# Patient Record
Sex: FEMALE | Race: WHITE | NOT HISPANIC OR LATINO | Employment: STUDENT | URBAN - METROPOLITAN AREA
[De-identification: names, ages, dates, MRNs, and addresses within clinical notes are randomized per-mention and may not be internally consistent; named-entity substitution may affect disease eponyms.]

---

## 2023-12-14 ENCOUNTER — OFFICE VISIT (OUTPATIENT)
Dept: OBSTETRICS AND GYNECOLOGY | Facility: CLINIC | Age: 18
End: 2023-12-14
Payer: COMMERCIAL

## 2023-12-14 ENCOUNTER — LAB (OUTPATIENT)
Dept: LAB | Facility: LAB | Age: 18
End: 2023-12-14
Payer: COMMERCIAL

## 2023-12-14 VITALS
BODY MASS INDEX: 18.9 KG/M2 | HEIGHT: 66 IN | HEART RATE: 56 BPM | SYSTOLIC BLOOD PRESSURE: 106 MMHG | WEIGHT: 117.6 LBS | DIASTOLIC BLOOD PRESSURE: 70 MMHG

## 2023-12-14 DIAGNOSIS — N91.2 AMENORRHEA: ICD-10-CM

## 2023-12-14 DIAGNOSIS — Z32.02 PREGNANCY TEST NEGATIVE: ICD-10-CM

## 2023-12-14 LAB
DHEA-S SERPL-MCNC: 150 UG/DL (ref 65–395)
FSH SERPL-ACNC: 2.4 IU/L
PREGNANCY TEST URINE, POC: NEGATIVE
PROLACTIN SERPL-MCNC: 2.3 UG/L (ref 3–20)

## 2023-12-14 PROCEDURE — 36415 COLL VENOUS BLD VENIPUNCTURE: CPT

## 2023-12-14 PROCEDURE — 83001 ASSAY OF GONADOTROPIN (FSH): CPT

## 2023-12-14 PROCEDURE — 84146 ASSAY OF PROLACTIN: CPT

## 2023-12-14 PROCEDURE — 84402 ASSAY OF FREE TESTOSTERONE: CPT

## 2023-12-14 PROCEDURE — 99203 OFFICE O/P NEW LOW 30 MIN: CPT | Performed by: NURSE PRACTITIONER

## 2023-12-14 PROCEDURE — 82627 DEHYDROEPIANDROSTERONE: CPT

## 2023-12-14 PROCEDURE — 99213 OFFICE O/P EST LOW 20 MIN: CPT | Performed by: NURSE PRACTITIONER

## 2023-12-14 RX ORDER — FLUTICASONE PROPIONATE 50 MCG
SPRAY, SUSPENSION (ML) NASAL
COMMUNITY
Start: 2022-10-14

## 2023-12-14 RX ORDER — NORETHINDRONE ACETATE/ETHINYL ESTRADIOL AND FERROUS FUMARATE 1MG-20(24)
1 KIT ORAL DAILY
COMMUNITY
Start: 2022-11-22

## 2023-12-14 ASSESSMENT — ENCOUNTER SYMPTOMS
RESPIRATORY NEGATIVE: 0
CONSTITUTIONAL NEGATIVE: 0
HEMATOLOGIC/LYMPHATIC NEGATIVE: 0
CARDIOVASCULAR NEGATIVE: 0
ENDOCRINE NEGATIVE: 0
GASTROINTESTINAL NEGATIVE: 0
ALLERGIC/IMMUNOLOGIC NEGATIVE: 0
NEUROLOGICAL NEGATIVE: 0
PSYCHIATRIC NEGATIVE: 0
EYES NEGATIVE: 0
MUSCULOSKELETAL NEGATIVE: 0

## 2023-12-14 ASSESSMENT — PAIN SCALES - GENERAL: PAINLEVEL: 0-NO PAIN

## 2023-12-14 NOTE — PROGRESS NOTES
Subjective   Patient ID: Titus Milner is a 17 y.o. female who presents for Amenorrhea.    She has lost almost 40 pounds over the last year  She is working out and really watching what she is eating.  She has not had a menses since June  She is away at school    Prior to this her menses were regular and light  No cramping    She was prescribed Microgestin by her PCP but never took it          Physical Exam  Constitutional:       Appearance: Normal appearance. She is normal weight.   Neurological:      Mental Status: She is alert.   Psychiatric:         Mood and Affect: Mood normal.         Behavior: Behavior normal.         Thought Content: Thought content normal.         Judgment: Judgment normal.   Vitals and nursing note reviewed.            Assessment/Plan   Diagnoses and all orders for this visit:  Amenorrhea  -     POC pregnancy, urine  -     Testosterone,Free and Total; Future  -     Follicle Stimulating Hormone; Future  -     Prolactin; Future  -     DHEA-Sulfate; Future  Pregnancy test negative  -     POC pregnancy, urine    Will check lab work.   Depending on results will order provera for withdraw bleed       DARIANA Schneider-CNP 12/14/23 9:37 AM

## 2023-12-15 ENCOUNTER — LAB (OUTPATIENT)
Dept: LAB | Facility: LAB | Age: 18
End: 2023-12-15
Payer: COMMERCIAL

## 2023-12-15 ENCOUNTER — OFFICE VISIT (OUTPATIENT)
Dept: PEDIATRICS | Facility: CLINIC | Age: 18
End: 2023-12-15
Payer: COMMERCIAL

## 2023-12-15 VITALS
HEIGHT: 66 IN | SYSTOLIC BLOOD PRESSURE: 107 MMHG | WEIGHT: 114.9 LBS | DIASTOLIC BLOOD PRESSURE: 73 MMHG | HEART RATE: 73 BPM | BODY MASS INDEX: 18.47 KG/M2

## 2023-12-15 DIAGNOSIS — R63.4 UNINTENDED WEIGHT LOSS: Primary | ICD-10-CM

## 2023-12-15 DIAGNOSIS — Z00.01 ENCOUNTER FOR ROUTINE ADULT HEALTH EXAMINATION WITH ABNORMAL FINDINGS: ICD-10-CM

## 2023-12-15 DIAGNOSIS — F50.82 AVOIDANT-RESTRICTIVE FOOD INTAKE DISORDER (ARFID): ICD-10-CM

## 2023-12-15 DIAGNOSIS — N91.1 SECONDARY AMENORRHEA: ICD-10-CM

## 2023-12-15 DIAGNOSIS — R63.4 UNINTENDED WEIGHT LOSS: ICD-10-CM

## 2023-12-15 PROBLEM — M21.70 ACQUIRED LEG LENGTH DISCREPANCY: Status: RESOLVED | Noted: 2023-12-15 | Resolved: 2023-12-15

## 2023-12-15 PROBLEM — M54.50 BACK PAIN, LUMBOSACRAL: Status: RESOLVED | Noted: 2023-12-15 | Resolved: 2023-12-15

## 2023-12-15 PROBLEM — B34.9 VIRAL SYNDROME: Status: RESOLVED | Noted: 2023-12-15 | Resolved: 2023-12-15

## 2023-12-15 PROBLEM — Z20.822 EXPOSURE TO COVID-19 VIRUS: Status: RESOLVED | Noted: 2023-12-15 | Resolved: 2023-12-15

## 2023-12-15 PROBLEM — L20.9 ATOPIC DERMATITIS, UNSPECIFIED: Status: RESOLVED | Noted: 2019-12-06 | Resolved: 2023-12-15

## 2023-12-15 PROBLEM — U07.1 COVID-19: Status: RESOLVED | Noted: 2023-12-15 | Resolved: 2023-12-15

## 2023-12-15 PROBLEM — H52.209 ASTIGMATISM: Status: ACTIVE | Noted: 2023-12-15

## 2023-12-15 PROBLEM — H69.90 ETD (EUSTACHIAN TUBE DYSFUNCTION): Status: RESOLVED | Noted: 2023-12-15 | Resolved: 2023-12-15

## 2023-12-15 PROBLEM — J31.0 RHINITIS: Status: RESOLVED | Noted: 2023-12-15 | Resolved: 2023-12-15

## 2023-12-15 PROBLEM — H65.91 RIGHT SEROUS OTITIS MEDIA: Status: RESOLVED | Noted: 2023-12-15 | Resolved: 2023-12-15

## 2023-12-15 PROBLEM — H52.10 AXIAL MYOPIA: Status: RESOLVED | Noted: 2023-12-15 | Resolved: 2023-12-15

## 2023-12-15 PROBLEM — H66.90 OTITIS MEDIA: Status: RESOLVED | Noted: 2023-12-15 | Resolved: 2023-12-15

## 2023-12-15 PROBLEM — J32.9 SINUSITIS: Status: RESOLVED | Noted: 2023-12-15 | Resolved: 2023-12-15

## 2023-12-15 PROBLEM — F51.8 DISRUPTED SLEEP-WAKE CYCLE: Status: RESOLVED | Noted: 2023-12-15 | Resolved: 2023-12-15

## 2023-12-15 PROBLEM — R05.9 COUGH: Status: RESOLVED | Noted: 2023-12-15 | Resolved: 2023-12-15

## 2023-12-15 PROBLEM — G43.709 CHRONIC MIGRAINE W/O AURA W/O STATUS MIGRAINOSUS, NOT INTRACTABLE: Status: ACTIVE | Noted: 2023-12-15

## 2023-12-15 PROBLEM — G47.20 DISRUPTED SLEEP-WAKE CYCLE: Status: RESOLVED | Noted: 2023-12-15 | Resolved: 2023-12-15

## 2023-12-15 PROBLEM — R51.9 HEADACHE: Status: RESOLVED | Noted: 2023-12-15 | Resolved: 2023-12-15

## 2023-12-15 PROBLEM — G43.909 MIGRAINE HEADACHE: Status: ACTIVE | Noted: 2023-12-15

## 2023-12-15 PROBLEM — M41.129 ADOLESCENT IDIOPATHIC SCOLIOSIS: Status: ACTIVE | Noted: 2023-12-15

## 2023-12-15 PROBLEM — J05.0 CROUP: Status: RESOLVED | Noted: 2023-12-15 | Resolved: 2023-12-15

## 2023-12-15 PROBLEM — J02.9 PHARYNGITIS, ACUTE: Status: RESOLVED | Noted: 2023-12-15 | Resolved: 2023-12-15

## 2023-12-15 LAB
BASOPHILS # BLD AUTO: 0.08 X10*3/UL (ref 0–0.1)
BASOPHILS NFR BLD AUTO: 1.4 %
EOSINOPHIL # BLD AUTO: 0.04 X10*3/UL (ref 0–0.7)
EOSINOPHIL NFR BLD AUTO: 0.7 %
ERYTHROCYTE [DISTWIDTH] IN BLOOD BY AUTOMATED COUNT: 11.3 % (ref 11.5–14.5)
HCT VFR BLD AUTO: 39.7 % (ref 36–46)
HGB BLD-MCNC: 13.3 G/DL (ref 12–16)
IMM GRANULOCYTES # BLD AUTO: 0.01 X10*3/UL (ref 0–0.7)
IMM GRANULOCYTES NFR BLD AUTO: 0.2 % (ref 0–0.9)
LYMPHOCYTES # BLD AUTO: 1.97 X10*3/UL (ref 1.2–4.8)
LYMPHOCYTES NFR BLD AUTO: 33.7 %
MCH RBC QN AUTO: 32.3 PG (ref 26–34)
MCHC RBC AUTO-ENTMCNC: 33.5 G/DL (ref 32–36)
MCV RBC AUTO: 96 FL (ref 80–100)
MONOCYTES # BLD AUTO: 0.42 X10*3/UL (ref 0.1–1)
MONOCYTES NFR BLD AUTO: 7.2 %
NEUTROPHILS # BLD AUTO: 3.33 X10*3/UL (ref 1.2–7.7)
NEUTROPHILS NFR BLD AUTO: 56.8 %
NRBC BLD-RTO: 0 /100 WBCS (ref 0–0)
PLATELET # BLD AUTO: 269 X10*3/UL (ref 150–450)
RBC # BLD AUTO: 4.12 X10*6/UL (ref 4–5.2)
TSH SERPL-ACNC: 1.01 MIU/L (ref 0.44–3.98)
WBC # BLD AUTO: 5.9 X10*3/UL (ref 4.4–11.3)

## 2023-12-15 PROCEDURE — 36415 COLL VENOUS BLD VENIPUNCTURE: CPT

## 2023-12-15 PROCEDURE — 82728 ASSAY OF FERRITIN: CPT

## 2023-12-15 PROCEDURE — 99395 PREV VISIT EST AGE 18-39: CPT | Performed by: PEDIATRICS

## 2023-12-15 PROCEDURE — 96127 BRIEF EMOTIONAL/BEHAV ASSMT: CPT | Performed by: PEDIATRICS

## 2023-12-15 PROCEDURE — 84443 ASSAY THYROID STIM HORMONE: CPT

## 2023-12-15 PROCEDURE — 83735 ASSAY OF MAGNESIUM: CPT

## 2023-12-15 PROCEDURE — 85025 COMPLETE CBC W/AUTO DIFF WBC: CPT

## 2023-12-15 PROCEDURE — 83550 IRON BINDING TEST: CPT

## 2023-12-15 PROCEDURE — 83540 ASSAY OF IRON: CPT

## 2023-12-15 PROCEDURE — 80053 COMPREHEN METABOLIC PANEL: CPT

## 2023-12-15 NOTE — PROGRESS NOTES
"Subjective     Titus is here with her mother for her annual WCC.    Parental Issues:  Questions or concerns:  either none, or only commonly asked age-specific questions; weight loss of 25 lbs since August and almost 40 lbs from last year. Titus has not had a period since June 2023. She saw GYN yesterday who ordered labs for secondary amenorrhea. She saw GI in August because of constipation.   Titus says she was not happy with her weight or eating habits last spring at college. She would eat meals during the day then \"binge\" at night. She was weight lifting and did not like how muscular she looked. She started to modify her eating habits last spring 2023. She limited her calories and carbs. She denies self harm or self induced emesis. She also denies laxative use but did use and weaned off of miralax per CARLYLE Farfan.   She is not seeing a therapist. She is surrounded by other young women with eating peculularities, ED's. She has good friends.   Mom is concerned about an ED with Titus. Titus says she wants to be healthy, says she realizes she went \"overboard\" with controlling her diet.   She is amenable to talking with a dietitian to develop some strategies for putting weight on in. She says she would go to health services at [facility] for weight and orthostatic bp/hr checks.  Family is leaving for vacation around [place].  Nutrition, Elimination, and Sleep:  Nutrition:  well-balanced diet, takes foods from each food group  Elimination:  normal frequency and quality of stool  Sleep:  normal for age    Social:  Peer relations:  no concerns  Family relations:  no concerns  School performance:  no concerns  Teen questionnaire:  reviewed  Activities:  sorority      Objective   Growth chart reviewed.  General:  Well-appearing, thin, dry skin,   hydrated  No acute distress, a little tearful when talking about eating and weight loss   Head:  Normocephalic   Eyes:  Lids and conjunctivae normal  Sclerae white  Pupils equal " and reactive   ENT:  Ears:  TMs normal bilaterally  Mouth:  mucosa moist; no visible lesions  Throat:  OP moist and clear; uvula midline  Neck:  supple; no thyroid enlargement   Respiratory:  Respiratory rate:  normal  Air exchange:  normal   Adventitious breath sounds:  none  Accessory muscle use:  none   Heart:  Rate and rhythm:  regular  Murmur:  none    Abdomen:  Palpation:  soft, non-tender, non-distended, no masses; flat/almost scaphoid abd, no masses  Organs:  no HSM  Bowel sounds:  normal   :  Normal external genitalia  Stef stage:  5   MSK: Range of motion:  grossly normal in all joints  Swelling:  none  Muscle bulk and strength:  grossly normal   Skin:  Warm and well-perfused  No rashes   Lymphatic: No nodes larger than 1 cm palpated  No firm or fixed nodes palpated   Neuro:  Alert  Moves all extremities spontaneously  CN:  grossly intact  Tone:  normal      Assessment/Plan   Titus has had an abnormal weight loss, secondary amenorrhea as a result of it. She is orthostatic by pulse but not by BP. I have ordered labs, cbc, cmp, magnesium, prealbumin, tsh; and EKG. I would like her to see Deanne Merino RD. I also want tohave her return next week for a weight check and review labs, EKG and plan for returning to college after winter break.    - Anticipatory guidance regarding development, safety, nutrition, physical activity, and sleep reviewed.  - Growth:  appropriate for age  - Development:  active and social   - Social:  teenage questionnaire completed and reviewed.  Issues of smoking, vaping, substance use, sexuality, and mood discussed.    - Vaccines:  as documented  - Return in 1 year for annual well child exam or sooner if concerns arise

## 2023-12-16 LAB
ALBUMIN SERPL BCP-MCNC: 5.1 G/DL (ref 3.4–5)
ALP SERPL-CCNC: 45 U/L (ref 33–110)
ALT SERPL W P-5'-P-CCNC: 26 U/L (ref 7–45)
ANION GAP SERPL CALC-SCNC: 17 MMOL/L (ref 10–20)
AST SERPL W P-5'-P-CCNC: 13 U/L (ref 9–39)
BILIRUB SERPL-MCNC: 0.6 MG/DL (ref 0–1.2)
BUN SERPL-MCNC: 18 MG/DL (ref 6–23)
CALCIUM SERPL-MCNC: 10.2 MG/DL (ref 8.6–10.6)
CHLORIDE SERPL-SCNC: 101 MMOL/L (ref 98–107)
CO2 SERPL-SCNC: 29 MMOL/L (ref 21–32)
CREAT SERPL-MCNC: 0.89 MG/DL (ref 0.5–1.05)
FERRITIN SERPL-MCNC: 78 NG/ML (ref 8–150)
GFR SERPL CREATININE-BSD FRML MDRD: >90 ML/MIN/1.73M*2
GLUCOSE SERPL-MCNC: 91 MG/DL (ref 74–99)
IRON SATN MFR SERPL: 32 % (ref 25–45)
IRON SERPL-MCNC: 100 UG/DL (ref 35–150)
MAGNESIUM SERPL-MCNC: 1.93 MG/DL (ref 1.6–2.4)
POTASSIUM SERPL-SCNC: 4.4 MMOL/L (ref 3.5–5.3)
PROT SERPL-MCNC: 7.7 G/DL (ref 6.4–8.2)
SODIUM SERPL-SCNC: 143 MMOL/L (ref 136–145)
TIBC SERPL-MCNC: 311 UG/DL (ref 240–445)
UIBC SERPL-MCNC: 211 UG/DL (ref 110–370)

## 2023-12-18 ENCOUNTER — HOSPITAL ENCOUNTER (OUTPATIENT)
Dept: CARDIOLOGY | Facility: CLINIC | Age: 18
Discharge: HOME | End: 2023-12-18
Payer: COMMERCIAL

## 2023-12-18 ENCOUNTER — APPOINTMENT (OUTPATIENT)
Dept: CARDIOLOGY | Facility: CLINIC | Age: 18
End: 2023-12-18
Payer: COMMERCIAL

## 2023-12-18 DIAGNOSIS — R63.4 UNINTENDED WEIGHT LOSS: ICD-10-CM

## 2023-12-18 LAB
TESTOSTERONE FREE (CHAN): 2 PG/ML (ref 0.1–6.4)
TESTOSTERONE,TOTAL,LC-MS/MS: 15 NG/DL (ref 2–45)

## 2023-12-18 PROCEDURE — 93010 ELECTROCARDIOGRAM REPORT: CPT | Performed by: STUDENT IN AN ORGANIZED HEALTH CARE EDUCATION/TRAINING PROGRAM

## 2023-12-18 PROCEDURE — 93005 ELECTROCARDIOGRAM TRACING: CPT

## 2023-12-19 LAB
ATRIAL RATE: 40 BPM
P AXIS: 66 DEGREES
P OFFSET: 208 MS
P ONSET: 150 MS
PR INTERVAL: 140 MS
Q ONSET: 220 MS
QRS COUNT: 6 BEATS
QRS DURATION: 92 MS
QT INTERVAL: 472 MS
QTC CALCULATION(BAZETT): 384 MS
QTC FREDERICIA: 412 MS
R AXIS: 104 DEGREES
T AXIS: 52 DEGREES
T OFFSET: 456 MS
VENTRICULAR RATE: 40 BPM

## 2023-12-19 NOTE — RESULT ENCOUNTER NOTE
Titus, I reviewed your labs and EKG. I think we have a follow up visit in a couple of days before your family goes away. How are you feeling and what updates do you have for me?   Please message me before we get together.

## 2023-12-20 ENCOUNTER — APPOINTMENT (OUTPATIENT)
Dept: OBSTETRICS AND GYNECOLOGY | Facility: CLINIC | Age: 18
End: 2023-12-20

## 2023-12-22 ENCOUNTER — OFFICE VISIT (OUTPATIENT)
Dept: PEDIATRICS | Facility: CLINIC | Age: 18
End: 2023-12-22
Payer: COMMERCIAL

## 2023-12-22 VITALS
DIASTOLIC BLOOD PRESSURE: 66 MMHG | SYSTOLIC BLOOD PRESSURE: 102 MMHG | BODY MASS INDEX: 18.58 KG/M2 | WEIGHT: 115.1 LBS | HEART RATE: 45 BPM

## 2023-12-22 DIAGNOSIS — R62.51 POOR WEIGHT GAIN IN PEDIATRIC PATIENT: ICD-10-CM

## 2023-12-22 DIAGNOSIS — R00.1 BRADYCARDIA: ICD-10-CM

## 2023-12-22 DIAGNOSIS — F50.82 AVOIDANT-RESTRICTIVE FOOD INTAKE DISORDER (ARFID): Primary | ICD-10-CM

## 2023-12-22 DIAGNOSIS — N91.1 SECONDARY AMENORRHEA: ICD-10-CM

## 2023-12-22 LAB
POC APPEARANCE, URINE: CLEAR
POC BILIRUBIN, URINE: NEGATIVE
POC BLOOD, URINE: NEGATIVE
POC COLOR, URINE: YELLOW
POC GLUCOSE, URINE: NEGATIVE MG/DL
POC KETONES, URINE: NEGATIVE MG/DL
POC LEUKOCYTES, URINE: NEGATIVE
POC NITRITE,URINE: NEGATIVE
POC PH, URINE: 7 PH
POC PROTEIN, URINE: NEGATIVE MG/DL
POC SPECIFIC GRAVITY, URINE: 1.01
POC UROBILINOGEN, URINE: 0.2 EU/DL

## 2023-12-22 PROCEDURE — 81003 URINALYSIS AUTO W/O SCOPE: CPT | Performed by: PEDIATRICS

## 2023-12-22 PROCEDURE — 99214 OFFICE O/P EST MOD 30 MIN: CPT | Performed by: PEDIATRICS

## 2023-12-22 RX ORDER — NORETHINDRONE ACETATE AND ETHINYL ESTRADIOL AND FERROUS FUMARATE 1MG-20(24)
1 KIT ORAL DAILY
Qty: 84 TABLET | Refills: 3 | Status: SHIPPED | OUTPATIENT
Start: 2023-12-22 | End: 2024-03-08 | Stop reason: SDUPTHER

## 2023-12-22 NOTE — PROGRESS NOTES
"Subjective     History was provided by the mother and patient .    Titus is here with the following concern:    Follow up labs and EKG and weight/bp/hr check for eating disorder. Bradycardia to 40 on EKG. I reviewed Titus's apple watch that has recorded her resting HR and it ranged from  over the past 6 months. Usually resting HR (while awake) in 50's, some dips to 40-45 but not sustained.   Weight is about the same as last week, up a few ounces. She has been eating more often, still some taboo foods and mostly \"clean\" vegetables, protein, not large portions. I spoke with Alisia Merino RD about her assessment and advice provided to Titus. She plans to continue to see Titus rmotely as long as Titus has weekly weight checks when back at [place] as well as monitoring orthostatic BP/HR. Titus says she agrees to doing the weekly checks at [place]. I recommend that she set up appointments now for mid January when she plans to return. I will write a letter for her to give providers there.    Sleeping ok. Titus would like to go the gym to do strength training and work out. She has not done anything since last week when I saw her.    Family is going to [place] in 2 days for a week.    Objective     /66 (BP Location: Left arm, Patient Position: Lying)   Pulse (!) 45   Wt 52.2 kg (115 lb 1.6 oz)   LMP 06/10/2023 (Approximate)   BMI 18.58 kg/m²   @physicalexam@    General:  Well-appearing, well hydrated , thin underweight and in no acute distress     Eyes:  Lids:  normal  Conjunctivae:  normal     ENT:  Ears:  RTM: normal yes           LTM:  normal yes  Nose:  nares clear  Mouth:  mucosa moist; no visible lesions  Throat:  OP clear yes and moist; uvula midline  Neck:  supple     Respiratory:  Respiratory rate:  normal  Air exchange:  normal   Adventitious breath sounds:  none  Accessory muscle use:  none     Heart:  Regular rate and rhythm, no murmur     GI: Normal bowel sounds, soft, non-tender, no HSM   "   Skin:  Warm and well-perfused and no rashes apparent     Lymphatic: No nodes larger than 1 cm palpated  No firm or fixed nodes palpated       Assessment/Plan     Titus Milner is well-appearing, well-hydrated, in no acute distress, and afebrile at today's visit.    Her clinical presentation and examination indicates the diagnosis of Anorexia nervosa; weight unchanged despite increase meals, not exercising. Also bradycardia.   I reviewed BP/HR, dipped to 45; BP stable with change in position. UA clear.      Her treatment plan includes (1) increase calories based on recommendations from dietitian  (2) no exercise other than walks with parents/sibs; no strength training  (3) water/electrolyte solution to replenish losses, boost intake  (4) Make appointments with [facility]; identify provider with whom she can follow  (5) Start OCP for amenorrhea, I will send in rx    (6) follow up 1/8/24 for wt/bp/hr/ua.    Supportive care measures and expected course of illness reviewed.    Follow up promptly for worsening or prolonged illness.    La Cadena MD

## 2024-01-07 PROBLEM — F50.82 AVOIDANT-RESTRICTIVE FOOD INTAKE DISORDER (ARFID): Status: ACTIVE | Noted: 2024-01-07

## 2024-01-08 ENCOUNTER — OFFICE VISIT (OUTPATIENT)
Dept: PEDIATRICS | Facility: CLINIC | Age: 19
End: 2024-01-08
Payer: COMMERCIAL

## 2024-01-08 DIAGNOSIS — F50.82 AVOIDANT-RESTRICTIVE FOOD INTAKE DISORDER (ARFID): Primary | ICD-10-CM

## 2024-01-08 PROCEDURE — 99214 OFFICE O/P EST MOD 30 MIN: CPT | Performed by: PEDIATRICS

## 2024-01-08 NOTE — PROGRESS NOTES
Subjective   Patient ID: Titus Milner is a 18 y.o. female who is here with her mother, who gives much of the history, for concern of Weight Check.    HPI  Titus is here for follow-up of weight loss related to to ARFID.  She denies any concerns or symptoms of malnourishment.  Last dizziness was ~Thanksgiving 2023  Denies purging  Cutting out exercise has been difficult  She has not found it too challenging to abide by the recommendations of the nutritionist, Zaira Merino.  Her energy level has improved with improved food intake.  She feels her mood is good and denies symptoms of depression and anxiety.  She hopes to return to college at [employer] in 2 days.  She has not yet been able to identify a provider in the [facility] health services on campus as they have not been open; they will be returning to their office as the students return, and she plans to do so.    Objective   Vitals:    01/08/24 1008 01/08/24 1029 01/08/24 1035   BP:  94/66 96/66   Patient Position:  Sitting Standing   Pulse:  64 64   Temp: 36.7 °C (98 °F)     TempSrc: Temporal     Weight: 53.7 kg (118 lb 4.8 oz)        Physical Exam  Vitals reviewed.   Constitutional:       General: She is not in acute distress.     Appearance: Normal appearance. She is not ill-appearing.   HENT:      Nose: Nose normal.      Mouth/Throat:      Mouth: Mucous membranes are moist.      Pharynx: Oropharynx is clear.   Eyes:      Conjunctiva/sclera: Conjunctivae normal.   Neck:      Thyroid: No thyroid mass or thyromegaly.   Cardiovascular:      Rate and Rhythm: Normal rate and regular rhythm.      Pulses: Normal pulses.      Heart sounds: Normal heart sounds. No murmur heard.     No gallop.   Pulmonary:      Effort: Pulmonary effort is normal. No respiratory distress.      Breath sounds: Normal breath sounds.   Abdominal:      General: There is no distension.      Palpations: Abdomen is soft. There is no hepatomegaly, splenomegaly or mass.      Tenderness:  There is no abdominal tenderness.      Hernia: No hernia is present.   Musculoskeletal:         General: No swelling, deformity or signs of injury.      Cervical back: Normal range of motion and neck supple.   Skin:     General: Skin is warm and dry.   Neurological:      Motor: No weakness.     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  She is cleared to return to college, per previously stated plan.  She will connect with a provider on campus, continue to see a nutritionist, and follow-up with her PCP Dr. Cadena in 2 weeks, sooner if needed.

## 2024-01-09 VITALS
SYSTOLIC BLOOD PRESSURE: 96 MMHG | HEART RATE: 64 BPM | TEMPERATURE: 98 F | BODY MASS INDEX: 19.09 KG/M2 | WEIGHT: 118.3 LBS | DIASTOLIC BLOOD PRESSURE: 66 MMHG

## 2024-01-22 ENCOUNTER — TELEMEDICINE (OUTPATIENT)
Dept: PEDIATRICS | Facility: CLINIC | Age: 19
End: 2024-01-22
Payer: COMMERCIAL

## 2024-01-22 VITALS — WEIGHT: 117 LBS | BODY MASS INDEX: 18.88 KG/M2

## 2024-01-22 DIAGNOSIS — F50.82 AVOIDANT-RESTRICTIVE FOOD INTAKE DISORDER (ARFID): Primary | ICD-10-CM

## 2024-01-22 PROCEDURE — 99213 OFFICE O/P EST LOW 20 MIN: CPT | Performed by: PEDIATRICS

## 2024-01-22 NOTE — PROGRESS NOTES
Follow up ED, weight loss, amenorrhea via virtual visit.    Titus is at [employer] and she is adjusting to college life again. She has told close friends about what she is working on, support her to make better choices. She weighed herself at the gym yesterday, 117#, no loss but no weight gain.  She has limited exercise but would like to get back to it. She is seeing Alisia Merino dietitian every other week. Alisia given Titus specific ideas about getting enough calories in.     Alert, well appearing, NAD.  Oriented person/place, etc.  Nonfocal exam.    Titus will contact student health at [employer] today to set up weekly weigh ins and orthostatic vital signs. If titus cannot get in this week/next week, she will set up another virtual appointment with me.

## 2024-01-26 ENCOUNTER — TELEPHONE (OUTPATIENT)
Dept: PEDIATRICS | Facility: CLINIC | Age: 19
End: 2024-01-26
Payer: COMMERCIAL

## 2024-02-29 NOTE — PROGRESS NOTES
Notes from [facility]:    Weight 2/13/24 121 lb; VS: 108/69, p 60 lying; 108/72, p 66 sitting; 108/74, p 73 standing    2/20/2024:  124.6 lb; VS: 116/77 p 68 lying; 117/81, p 76 sitting; 119/85, p 80 standing    2/27/2024; 123.2 LB; 113/67 P62 LYING; 107/69 P 75 SITTING; 11/75 p 84 standing

## 2024-03-08 DIAGNOSIS — N91.1 SECONDARY AMENORRHEA: ICD-10-CM

## 2024-03-08 RX ORDER — NORETHINDRONE ACETATE AND ETHINYL ESTRADIOL AND FERROUS FUMARATE 1MG-20(24)
1 KIT ORAL DAILY
Qty: 84 TABLET | Refills: 3 | Status: SHIPPED | OUTPATIENT
Start: 2024-03-08 | End: 2024-05-04 | Stop reason: SDUPTHER

## 2024-03-25 ENCOUNTER — TELEMEDICINE (OUTPATIENT)
Dept: PEDIATRICS | Facility: CLINIC | Age: 19
End: 2024-03-25
Payer: COMMERCIAL

## 2024-03-25 DIAGNOSIS — F50.82 AVOIDANT-RESTRICTIVE FOOD INTAKE DISORDER (ARFID): Primary | ICD-10-CM

## 2024-03-25 DIAGNOSIS — N91.1 SECONDARY AMENORRHEA: ICD-10-CM

## 2024-03-25 PROCEDURE — 1036F TOBACCO NON-USER: CPT | Performed by: PEDIATRICS

## 2024-03-25 PROCEDURE — 99214 OFFICE O/P EST MOD 30 MIN: CPT | Performed by: PEDIATRICS

## 2024-03-25 NOTE — PROGRESS NOTES
Subjective     History was provided by the  patient . Visit was done via telehealth in real time.    Titus is here with the following concern:    Follow up eating disorder, weight loss, amenorrhea. Titus has been going for weekly weight checks at [facility],  meeting with a dietitian and therapist regularly. She has been steadily regaining weight and is currently 126 #, resting HR >60; vital signs have been normal.   Titus says she is feeling more energetic, started to go to gym 2 days a week to weight lift. Dietitian has given her recovery snack recommendations.  Titus will finish her exams May 2 and return to [employer] on may 4.  She is going to [facility] for 8 weeks this summer.    She is on ocp but has not had a period on placebo pills. She started her 4 th pill pack. She is not currently SA. She is concerned that even though she has gained weight, is feeling better that she is not having a withdrawal bleed. We talked about going off of ocp for a couple of months to see if she gets her period but with travel, it is a little difficult.    Objective     There were no vitals taken for this visit.  @physicalexam@    General:  Well-appearing, well hydrated and in no acute distress     Eyes:  Lids:  normal  Conjunctivae:  normal     ENT:     Respiratory:     Heart:     GI:    Skin:       Assessment/Plan     Titus Milner is well-appearing, well-hydrated, in no acute distress, and afebrile at today's visit.    Her clinical presentation and examination indicates the diagnosis of resolving restrictive eating and weight loss; still amenorrhea but on ocp.    Her treatment plan includes ok to back off on weekly weigh ins, go to every other week. TO follow up with me in early May when back in [place]. Continue therapy and dietitian help every 2-3 weeks.    Supportive care measures and expected course of illness reviewed.    Follow up promptly for worsening or prolonged illness.    La Cadena,  MD

## 2024-05-04 DIAGNOSIS — N91.1 SECONDARY AMENORRHEA: ICD-10-CM

## 2024-05-06 ENCOUNTER — OFFICE VISIT (OUTPATIENT)
Dept: PEDIATRICS | Facility: CLINIC | Age: 19
End: 2024-05-06
Payer: COMMERCIAL

## 2024-05-06 VITALS
OXYGEN SATURATION: 99 % | BODY MASS INDEX: 21.31 KG/M2 | DIASTOLIC BLOOD PRESSURE: 69 MMHG | HEART RATE: 58 BPM | SYSTOLIC BLOOD PRESSURE: 108 MMHG | WEIGHT: 132 LBS

## 2024-05-06 DIAGNOSIS — N91.1 SECONDARY AMENORRHEA: ICD-10-CM

## 2024-05-06 DIAGNOSIS — F50.82 AVOIDANT-RESTRICTIVE FOOD INTAKE DISORDER (ARFID): Primary | ICD-10-CM

## 2024-05-06 PROCEDURE — 99213 OFFICE O/P EST LOW 20 MIN: CPT | Performed by: PEDIATRICS

## 2024-05-06 RX ORDER — NORETHINDRONE ACETATE AND ETHINYL ESTRADIOL AND FERROUS FUMARATE 1MG-20(24)
1 KIT ORAL DAILY
Qty: 84 TABLET | Refills: 3 | Status: SHIPPED | OUTPATIENT
Start: 2024-05-06 | End: 2025-05-06

## 2024-05-06 RX ORDER — NORETHINDRONE ACETATE AND ETHINYL ESTRADIOL AND FERROUS FUMARATE 1MG-20(24)
1 KIT ORAL DAILY
Qty: 84 TABLET | Refills: 3 | Status: SHIPPED | OUTPATIENT
Start: 2024-05-06 | End: 2024-05-06 | Stop reason: SDUPTHER

## 2024-05-06 NOTE — PROGRESS NOTES
Subjective     History was provided by the  patient .    Titus is here with the following concern:    Here for follow up AN; weight restored and finished follow up in Student [facility] at [employer]. Saw a nutritionist, therapist, MD.   On placebo pills and Titus would like to stop OCP.  Started to work out again, weight lifting 3 times/week.  Titus is going to [facility] for the summer.     She noticed when she regained weight she has more energy and is able to think more clearly. She does not think she will need to continue weight monitoring next fall at [facility].     Objective     /69 (Patient Position: Lying)   Pulse 58   Wt 59.9 kg (132 lb)   SpO2 99%   BMI 21.31 kg/m²   @physicalexam@    General:  Well-appearing, well hydrated and in no acute distress     Eyes:  Lids:  normal  Conjunctivae:  normal     ENT:  Ears:  RTM: normal yes           LTM:  normal yes  Nose:  nares clear  Mouth:  mucosa moist; no visible lesions  Throat:  OP clear yes and moist; uvula midline  Neck:  supple     Respiratory:  Respiratory rate:  normal  Air exchange:  normal   Adventitious breath sounds:  none  Accessory muscle use:  none     Heart:  Regular rate and rhythm, no murmur     GI: Normal bowel sounds, soft, non-tender, no HSM     Skin:  Warm and well-perfused and no rashes apparent     Lymphatic: No nodes larger than 1 cm palpated  No firm or fixed nodes palpated       Assessment/Plan     Titus Milner is well-appearing, well-hydrated, in no acute distress, and afebrile at today's visit.    Her clinical presentation and examination indicates the diagnosis of resolved weight loss, weight restored, anticipate menses soon (spotted last month). Stop ocp and see how periods are. I sent in rx for ocp packs to take with her to [facility] in case she wants to restart ocp while away.     Her treatment plan includes no restrictions, continue with healthier eating habits. Follow up re ocp as needed. Weight check before going back  to [facility] in August. Sooner if other concerns arise.    Supportive care measures and expected course of illness reviewed.    Follow up promptly for worsening or prolonged illness.    La Cadena MD

## 2024-11-29 ENCOUNTER — APPOINTMENT (OUTPATIENT)
Dept: OPHTHALMOLOGY | Facility: CLINIC | Age: 19
End: 2024-11-29
Payer: COMMERCIAL

## 2024-11-29 DIAGNOSIS — H52.13 MYOPIA OF BOTH EYES: Primary | ICD-10-CM

## 2024-11-29 ASSESSMENT — ENCOUNTER SYMPTOMS
NEUROLOGICAL NEGATIVE: 0
PSYCHIATRIC NEGATIVE: 0
RESPIRATORY NEGATIVE: 0
MUSCULOSKELETAL NEGATIVE: 0
CARDIOVASCULAR NEGATIVE: 0
CONSTITUTIONAL NEGATIVE: 0
HEMATOLOGIC/LYMPHATIC NEGATIVE: 0
ENDOCRINE NEGATIVE: 0
ALLERGIC/IMMUNOLOGIC NEGATIVE: 0
GASTROINTESTINAL NEGATIVE: 0
EYES NEGATIVE: 1

## 2024-11-29 ASSESSMENT — CONF VISUAL FIELD
OS_NORMAL: 1
OD_NORMAL: 1
OS_INFERIOR_NASAL_RESTRICTION: 0
OS_INFERIOR_TEMPORAL_RESTRICTION: 0
OS_SUPERIOR_NASAL_RESTRICTION: 0
METHOD: COUNTING FINGERS
OD_INFERIOR_NASAL_RESTRICTION: 0
OD_SUPERIOR_TEMPORAL_RESTRICTION: 0
OD_SUPERIOR_NASAL_RESTRICTION: 0
OS_SUPERIOR_TEMPORAL_RESTRICTION: 0
OD_INFERIOR_TEMPORAL_RESTRICTION: 0

## 2024-11-29 ASSESSMENT — REFRACTION_CURRENTRX
OD_DIAMETER: 14.2
OS_BRAND: PRECISION 1
OS_DIAMETER: 14.2
OD_SPHERE: -2.50
OD_CYLINDER: SPHERE
OS_CYLINDER: SPHERE
OS_SPHERE: -3.00
OS_BASECURVE: 8.3
OS_SPHERE: -3.25
OD_SPHERE: -2.75
OS_DIAMETER: 14.2
OD_CYLINDER: SPHERE
OD_DIAMETER: 14.2
OS_BRAND: PRECISION 1
OD_BASECURVE: 8.3
OS_CYLINDER: SPHERE
OD_BRAND: PRECISION 1
OD_BRAND: PRECISION 1
OS_BASECURVE: 8.3
OD_BASECURVE: 8.3

## 2024-11-29 ASSESSMENT — REFRACTION_MANIFEST
OS_SPHERE: -3.25
OS_CYLINDER: SPHER
OD_CYLINDER: SPHER
OD_SPHERE: -2.75

## 2024-11-29 ASSESSMENT — VISUAL ACUITY
VA_OR_OS_CURRENT_RX: 20/20
VA_OR_OD_CURRENT_RX: 20/20
METHOD: SNELLEN - LINEAR
OS_CC: J1+
OS_CC: 20/20
VA_OR_OS_CURRENT_RX: 20/20
CORRECTION_TYPE: CONTACTS
VA_OR_OD_CURRENT_RX: 20/20
OD_CC: 20/20-
OD_CC: J1+

## 2024-11-29 ASSESSMENT — TONOMETRY
IOP_METHOD: GOLDMANN APPLANATION
OD_IOP_MMHG: 16
OS_IOP_MMHG: 16

## 2024-11-29 ASSESSMENT — CUP TO DISC RATIO
OS_RATIO: .1
OD_RATIO: .1

## 2024-11-29 ASSESSMENT — EXTERNAL EXAM - RIGHT EYE: OD_EXAM: NORMAL

## 2024-11-29 ASSESSMENT — REFRACTION_WEARINGRX
OS_SPHERE: -3.00
OS_CYLINDER: SPHER
OD_CYLINDER: SPHER
OD_SPHERE: -2.25

## 2024-11-29 ASSESSMENT — EXTERNAL EXAM - LEFT EYE: OS_EXAM: NORMAL

## 2024-11-29 ASSESSMENT — SLIT LAMP EXAM - LIDS
COMMENTS: GOOD POSITION
COMMENTS: GOOD POSITION

## 2024-11-29 NOTE — PROGRESS NOTES
Assessment/Plan   Diagnoses and all orders for this visit:  Myopia of both eyes - Progressed  New spec rx released today per patient request. Ocular health wnl for age OU. Monitor 1 year or sooner prn. Refraction billed today. Pt consents to receiving glasses Rx today. Patient's/guardian's signature obtained to acknowledge and confirm that a paper copy of glasses Rx was given to patient in compliance with [facility] Eyeglass Rule. Electronic copy of Rx will also be available via [facility]/[facility].    A contact lens prescription was dispensed at the patient's request.     Pt denied dilation today due to prior obligation.  RTC at next available for dilation and ocular health examination, or in 1yr for CEx.

## 2024-12-19 ENCOUNTER — APPOINTMENT (OUTPATIENT)
Dept: PEDIATRICS | Facility: CLINIC | Age: 19
End: 2024-12-19
Payer: COMMERCIAL

## 2024-12-19 VITALS
BODY MASS INDEX: 21.07 KG/M2 | WEIGHT: 131.1 LBS | DIASTOLIC BLOOD PRESSURE: 65 MMHG | HEART RATE: 73 BPM | SYSTOLIC BLOOD PRESSURE: 98 MMHG | HEIGHT: 66 IN

## 2024-12-19 DIAGNOSIS — Z00.00 ENCOUNTER FOR ROUTINE ADULT HEALTH EXAMINATION WITHOUT ABNORMAL FINDINGS: Primary | ICD-10-CM

## 2024-12-19 PROCEDURE — 99395 PREV VISIT EST AGE 18-39: CPT | Performed by: PEDIATRICS

## 2024-12-19 PROCEDURE — 96127 BRIEF EMOTIONAL/BEHAV ASSMT: CPT | Performed by: PEDIATRICS

## 2024-12-19 PROCEDURE — 3008F BODY MASS INDEX DOCD: CPT | Performed by: PEDIATRICS

## 2024-12-19 ASSESSMENT — PATIENT HEALTH QUESTIONNAIRE - PHQ9
9. THOUGHTS THAT YOU WOULD BE BETTER OFF DEAD, OR OF HURTING YOURSELF: NOT AT ALL
10. IF YOU CHECKED OFF ANY PROBLEMS, HOW DIFFICULT HAVE THESE PROBLEMS MADE IT FOR YOU TO DO YOUR WORK, TAKE CARE OF THINGS AT HOME, OR GET ALONG WITH OTHER PEOPLE: NOT DIFFICULT AT ALL
3. TROUBLE FALLING OR STAYING ASLEEP: NOT AT ALL
9. THOUGHTS THAT YOU WOULD BE BETTER OFF DEAD, OR OF HURTING YOURSELF: NOT AT ALL
8. MOVING OR SPEAKING SO SLOWLY THAT OTHER PEOPLE COULD HAVE NOTICED. OR THE OPPOSITE - BEING SO FIDGETY OR RESTLESS THAT YOU HAVE BEEN MOVING AROUND A LOT MORE THAN USUAL: NOT AT ALL
2. FEELING DOWN, DEPRESSED OR HOPELESS: NOT AT ALL
7. TROUBLE CONCENTRATING ON THINGS, SUCH AS READING THE NEWSPAPER OR WATCHING TELEVISION: NOT AT ALL
SUM OF ALL RESPONSES TO PHQ9 QUESTIONS 1 & 2: 1
6. FEELING BAD ABOUT YOURSELF - OR THAT YOU ARE A FAILURE OR HAVE LET YOURSELF OR YOUR FAMILY DOWN: NOT AT ALL
1. LITTLE INTEREST OR PLEASURE IN DOING THINGS: SEVERAL DAYS
SUM OF ALL RESPONSES TO PHQ QUESTIONS 1-9: 3
6. FEELING BAD ABOUT YOURSELF - OR THAT YOU ARE A FAILURE OR HAVE LET YOURSELF OR YOUR FAMILY DOWN: NOT AT ALL
3. TROUBLE FALLING OR STAYING ASLEEP OR SLEEPING TOO MUCH: NOT AT ALL
4. FEELING TIRED OR HAVING LITTLE ENERGY: SEVERAL DAYS
8. MOVING OR SPEAKING SO SLOWLY THAT OTHER PEOPLE COULD HAVE NOTICED. OR THE OPPOSITE, BEING SO FIGETY OR RESTLESS THAT YOU HAVE BEEN MOVING AROUND A LOT MORE THAN USUAL: NOT AT ALL
5. POOR APPETITE OR OVEREATING: SEVERAL DAYS
10. IF YOU CHECKED OFF ANY PROBLEMS, HOW DIFFICULT HAVE THESE PROBLEMS MADE IT FOR YOU TO DO YOUR WORK, TAKE CARE OF THINGS AT HOME, OR GET ALONG WITH OTHER PEOPLE: NOT DIFFICULT AT ALL
4. FEELING TIRED OR HAVING LITTLE ENERGY: SEVERAL DAYS
1. LITTLE INTEREST OR PLEASURE IN DOING THINGS: SEVERAL DAYS
2. FEELING DOWN, DEPRESSED OR HOPELESS: NOT AT ALL
7. TROUBLE CONCENTRATING ON THINGS, SUCH AS READING THE NEWSPAPER OR WATCHING TELEVISION: NOT AT ALL
5. POOR APPETITE OR OVEREATING: SEVERAL DAYS

## 2024-12-19 NOTE — PROGRESS NOTES
Cheryle Qureshi is here for her annual WCC.    Issues/updates:  Questions or concerns:  either none, or only commonly asked age-specific questions; She is doing well in her third year at [employer]. She will be spending the spring semester in [place]. She feels healthier than last year. She is eating well, not restricting, exercising but not obsessively. She has good friends, living off campus and still involved with her sorority. She has a GYN appointment later today to discuss IUD.   Periods still a little irregular, light and PMS minimal.      Nutrition, Elimination, and Sleep:  Nutrition:  well-balanced diet, takes foods from each food group  Elimination:  normal frequency and quality of stool  Sleep:  normal for age    Social:  Peer relations:  no concerns  Family relations:  no concerns  School performance:  3rd year at [employer]  Teen questionnaire:  reviewed ASQ and PHQ9 low risk  Activities:  running 2-3 miles/1 time/wk; pure barre 2-3 d/week, light weight lifting      Objective   Growth chart reviewed.  General:  Well-appearing  Well-hydrated  No acute distress   Head:  Normocephalic   Eyes:  Lids and conjunctivae normal  Sclerae white  Pupils equal and reactive   ENT:  Ears:  TMs normal bilaterally  Mouth:  mucosa moist; no visible lesions  Throat:  OP moist and clear; uvula midline  Neck:  supple; no thyroid enlargement   Respiratory:  Respiratory rate:  normal  Air exchange:  normal   Adventitious breath sounds:  none  Accessory muscle use:  none   Heart:  Rate and rhythm:  regular  Murmur:  none    Abdomen:  Palpation:  soft, non-tender, non-distended, no masses  Organs:  no HSM  Bowel sounds:  normal   :  Normal external genitalia  Stef stage:  5   MSK: Range of motion:  grossly normal in all joints  Swelling:  none  Muscle bulk and strength:  grossly normal   Skin:  Warm and well-perfused  No rashes   Lymphatic: No nodes larger than 1 cm palpated  No firm or fixed nodes palpated   Neuro:   Alert  Moves all extremities spontaneously  CN:  grossly intact  Tone:  normal      Assessment/Plan   Titus is a healthy and thriving young woman recovering from restricted ED. No concerns today.     - Anticipatory guidance regarding development, safety, nutrition, physical activity, and sleep reviewed.  - Growth:  appropriate for age  - Development:  active and social   - Social:  teenage questionnaire completed and reviewed.  Issues of smoking, vaping, substance use, sexuality, and mood discussed.    - Vaccines:  as documented  - Return in 1 year for annual well child exam or sooner if concerns arise